# Patient Record
Sex: MALE | Race: WHITE | NOT HISPANIC OR LATINO | URBAN - METROPOLITAN AREA
[De-identification: names, ages, dates, MRNs, and addresses within clinical notes are randomized per-mention and may not be internally consistent; named-entity substitution may affect disease eponyms.]

---

## 2020-08-24 DIAGNOSIS — I20.89 ANGINAL EQUIVALENT: Primary | ICD-10-CM

## 2020-08-24 DIAGNOSIS — F17.200 SMOKER: ICD-10-CM

## 2020-08-24 DIAGNOSIS — R06.02 SOB (SHORTNESS OF BREATH): ICD-10-CM

## 2020-08-24 DIAGNOSIS — F17.200 TOBACCO USE DISORDER: ICD-10-CM

## 2020-08-24 DIAGNOSIS — Z79.899 ENCOUNTER FOR LONG-TERM (CURRENT) USE OF OTHER MEDICATIONS: ICD-10-CM

## 2020-08-24 DIAGNOSIS — R06.00 DYSPNEA, UNSPECIFIED TYPE: ICD-10-CM

## 2020-08-24 DIAGNOSIS — K21.9 GASTROESOPHAGEAL REFLUX DISEASE, ESOPHAGITIS PRESENCE NOT SPECIFIED: ICD-10-CM

## 2020-08-24 DIAGNOSIS — R06.02 SHORTNESS OF BREATH: ICD-10-CM

## 2020-08-28 ENCOUNTER — DOCUMENTATION ONLY (OUTPATIENT)
Dept: PEDIATRIC CARDIOLOGY | Facility: CLINIC | Age: 25
End: 2020-08-28

## 2020-08-28 ENCOUNTER — TELEPHONE (OUTPATIENT)
Dept: CARDIOLOGY | Facility: CLINIC | Age: 25
End: 2020-08-28

## 2020-08-28 DIAGNOSIS — R07.9 CHEST PAIN, UNSPECIFIED TYPE: ICD-10-CM

## 2020-08-28 DIAGNOSIS — I51.7 VENTRICULAR ENLARGEMENT, RIGHT: Primary | ICD-10-CM

## 2020-08-28 NOTE — TELEPHONE ENCOUNTER
Scheduled cardiac MRI for Sept 10 start time 10am. Appointment slip mailed to confirmed address on file. Mom verbalized understanding all information provided

## 2020-08-28 NOTE — PROGRESS NOTES
Discussed with Dr. Valentino Segal, the referring cardiologist from [place].  He would like this patient to have a cardiac MRI to rule out congenital heart disease.  Specifically, he is worried about right heart enlargement, partial anomalous pulmonary venous return, and possible anatomic coronary abnormalities given a long history of chest pain.    We will get the patient scheduled for the cardiac MRI.

## 2020-09-09 ENCOUNTER — TELEPHONE (OUTPATIENT)
Dept: CARDIOLOGY | Facility: CLINIC | Age: 25
End: 2020-09-09

## 2020-09-09 NOTE — TELEPHONE ENCOUNTER
Rescheduled cardiac MRI to Oct 1 start time 11am per request. Appointment slip mailed to confirmed address on file. Demetri verbalized understanding all information provided.  ----- Message from Alexandria Lamar sent at 9/9/2020  3:55 PM CDT -----  Regarding: Kin/KENDRICK Req  Contact: PT mother  CB Request, missed call     CBN# [phone]

## 2020-10-15 ENCOUNTER — TELEPHONE (OUTPATIENT)
Dept: PEDIATRIC CARDIOLOGY | Facility: CLINIC | Age: 25
End: 2020-10-15

## 2020-10-15 NOTE — TELEPHONE ENCOUNTER
Scheduled Cardiac MRI on Dec 3 start time 7am. Appointment slip mailed to confirmed address on file. Mom verbalized understanding all information   ----- Message from Celi Shukla sent at 10/15/2020  1:19 PM CDT -----  WOULD LIKE TO GET MEDICAL ADVICE.      Would the patient rather a call back or a response via [facility]?:  call back       Comments:  mom [phone] would like like to reschedule the pt missed appt

## 2020-12-01 ENCOUNTER — TELEPHONE (OUTPATIENT)
Dept: PEDIATRIC CARDIOLOGY | Facility: CLINIC | Age: 25
End: 2020-12-01

## 2020-12-01 NOTE — TELEPHONE ENCOUNTER
Appointment rescheduled to 1/28/20 start time 7 AM. Appointment slip mailed to confirmed address on file. Mom verbalized understanding all information provided.  ----- Message from Jazmine Santillan sent at 12/1/2020  1:55 PM CST -----  Contact: Mom- [phone]  Would like to get medical advice.    Symptoms (please be specific):  reschedule MRI    Comments:  Mom called to reschedule pt's MRI. She is requesting a call back.

## 2021-01-28 ENCOUNTER — HOSPITAL ENCOUNTER (OUTPATIENT)
Dept: RADIOLOGY | Facility: HOSPITAL | Age: 26
Discharge: HOME | End: 2021-01-28
Attending: PEDIATRICS
Payer: MEDICAID

## 2021-01-28 DIAGNOSIS — R07.9 CHEST PAIN, UNSPECIFIED TYPE: ICD-10-CM

## 2021-01-28 DIAGNOSIS — I51.7 VENTRICULAR ENLARGEMENT, RIGHT: ICD-10-CM

## 2021-01-28 PROCEDURE — 75561 CARDIAC MRI FOR MORPH W/DYE: CPT | Mod: 26,,, | Performed by: RADIOLOGY

## 2021-01-28 PROCEDURE — 75561 CARDIAC MRI FOR MORPH W/DYE: CPT | Mod: TC

## 2021-01-28 PROCEDURE — A9577 INJ MULTIHANCE: HCPCS | Performed by: PEDIATRICS

## 2021-01-28 PROCEDURE — 25500020 PHARM REV CODE 255: Performed by: PEDIATRICS

## 2021-01-28 PROCEDURE — 75561 MRI CARDIAC MORPHOLOGY FUNCTION W WO: ICD-10-PCS | Mod: 26,,, | Performed by: RADIOLOGY

## 2021-01-28 RX ADMIN — GADOBENATE DIMEGLUMINE 40 ML: 529 INJECTION, SOLUTION INTRAVENOUS at 08:01

## 2021-02-10 ENCOUNTER — TELEPHONE (OUTPATIENT)
Dept: PEDIATRIC CARDIOLOGY | Facility: CLINIC | Age: 26
End: 2021-02-10